# Patient Record
Sex: FEMALE | Employment: OTHER | URBAN - METROPOLITAN AREA
[De-identification: names, ages, dates, MRNs, and addresses within clinical notes are randomized per-mention and may not be internally consistent; named-entity substitution may affect disease eponyms.]

---

## 2019-04-08 ENCOUNTER — HOSPITAL ENCOUNTER (OUTPATIENT)
Dept: PHYSICAL THERAPY | Age: 64
Setting detail: THERAPIES SERIES
Discharge: HOME | End: 2019-04-08
Payer: COMMERCIAL

## 2019-04-08 PROCEDURE — 97140 MANUAL THERAPY 1/> REGIONS: CPT

## 2019-04-08 PROCEDURE — 97162 PT EVAL MOD COMPLEX 30 MIN: CPT

## 2019-04-08 ASSESSMENT — PAIN SCALES - GENERAL: PAINLEVEL_OUTOF10: 2

## 2019-04-08 NOTE — PROGRESS NOTES
Physical Therapy  Initial Assessment  Date: 2019  Patient Name: Elena Guerrero  MRN: [id number]  : 1955  Subjective   General  Chart Reviewed: Yes  Patient assessed for rehabilitation services?: Yes  Family / Caregiver Present: No  Referring Practitioner: Bull Treviño  Referral Date : 19  Diagnosis: R A' Pain  Follows Commands: Within Functional Limits  General Comment  Comments: PLOF: full functional activity without limits to pain. Walking without pain limitations. PT Visit Information  Onset Date: 19  PT Insurance Information: Medical [place]   Subjective  Subjective: Pt reports insidiously increased pain in the R medial ankle with weight bearing activities since 2019 that worsened in february with a lot of walking during vacation. Advil helped get her through the pain with exercising in February. Increased pain after walking and exercise at that time. Mid march MD recommended 500mg ibuprofen 2x/day for 2 weeks with improvement in pain. Continues to c/o pain with walking shoes around the house and even with shoes in the community. Tennis shoes provide the least amount of pain. Pt walks for exercise and pre-injury was walking 45-60 mins at 14 min/mile. Not able to do that. 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 Pt main complaint is not being able to walk. Pt has gained 10 lbs over the past year. No treatments to date. 10+ year history of 'twisting' the ankle with falls 2x. No N/T. No lumbar issues. No hip problems. \"crunchy' knees but no pain in the knees. Pain Screening  Patient Currently in Pain: Yes  Pain Assessment  Pain Assessment: 0-10  Pain Level: 2(at worst 6-7/10 )  Vital Signs  Patient Currently in Pain: Yes    Objective     Observation/Palpation  Palpation: Increased tenderness medial ankle, posterior tibialis  Observation: Standing:  L increased pes planus, hip IR and K' valgus. R great toe bunion. R pelvic rotation.  Increased WB R forefoot and L rear foot.   Body Mechanics: Squat test: increased R weight shift, quad dominance but OK, lumbar extension. SLS: R with lateral trunk shift compensation and LOB corrections lower leg. L with L hip lateral compensation and better stability. SLS squat L with good overall form, unable to do R without assist, increased K' valgus and hip IR. AMB:  increased B hip IR and K' valgus. Lumbar ERS, decreased R pes planus. PROM RLE (degrees)  RLE PROM: WNL  RLE General PROM: Hip ER 20 degrees  AROM RLE (degrees)  RLE AROM: WNL  PROM LLE (degrees)  LLE PROM: WNL  LLE General PROM: Hip IR 20 degrees   AROM LLE (degrees)  LLE AROM : WNL  Joint Mobility  Spine: Hypomobility R SI  ROM RLE: R AI, Hypomobility R tibio-femoral joint extension. Hypomobility TC joint, talar and calcaneal joint, mid foot. ROM LLE: Hypomobility L tibial IR on femur. Strength RLE  Strength RLE: WNL  Comment: Hip flexion 4-/5; Hip ER 4-/5; Hip IR 4/5  Strength LLE  Strength LLE: WNL  Comment: Hip flexion 4-/5; Hip ER 4-/5; Hip IR 4/5   Strength Other  Other: Hip extension with HS dominance and lumbar extension ERS     Additional Measures  Other: (-) slump test B (tightness in R hamstring); (-) clonus, (-) babinski. Normal DTR B LEs. Sensation to pin prick equal B LEs. Assessment   Conditions Requiring Skilled Therapeutic Intervention  Body structures, Functions, Activity limitations: Decreased functional mobility ; Decreased ROM; Decreased strength  Assessment: Pt presents with significant hypomobility throughout R LE including R hip joint, tibio-femoral joint and ankle/foot joints.     Prognosis: Good  Decision Making: Medium Complexity  REQUIRES PT FOLLOW UP: Yes         Plan   Plan  Times per week: 2x/wk for 4 weeks   Current Treatment Recommendations: Strengthening, ROM, Gait Training, Manual Therapy - Joint Manipulation, Neuromuscular Re-education, Balance Training, Endurance Training, Home Exercise Program, Manual Therapy - Soft Tissue

## 2019-04-08 NOTE — FLOWSHEET NOTE
Physical Therapy Daily Treatment Note    Date:  2019    Patient Name:  Tejinder Hummel    :  1955  MRN: [id number]  Restrictions/Precautions:    Medical/Treatment Diagnosis Information:  Diagnosis: R A' Pain  Insurance/Certification information:  PT Insurance Information: Medical [place]   Physician Information:  Referring Practitioner: Babak Warner  Plan of care signed (Y/N): Y  Visit# / total visits:      G-Code (if applicable):         PT G-Codes  Functional Assessment Tool Used: LEFS  Score: 38% disability     Time in:   11:00      Timed Treatment: 30 Total Treatment Time:  60  ________________________________________________________________________________________    Pain Level:    4/10  SUBJECTIVE:  See initial evaluation    OBJECTIVE:     Exercise/Equipment Resistance/Repetitions Other comments          Hip neuro re-ed IR/ER   NV    Clamshells  NV    TA sets NV    Bridging NV    Stacking with MB   NV     BAPS    NV                                    TG    x6 mins                          Ankle pumps x20    Ankle alphabet NV    Towel crunches NV             Other Therapeutic Activities:      Manual Treatments:  Gr 4 thoracic distraction with cavitation at set up. Gr 4 lumbar gapping with cavitation at set up L4-5. Gr 2-3 tibio-femoral M/L glides and tibial IR mobs. Gr 4 TC joint with cavitation. Gr 5 LAD with cavitation. Gr 4-5 mid foot mobs without cavitation. Talar whip. Calcaneal mobs gr 3-4. Modalities:      Test/Measurements:         ASSESSMENT:         Treatment/Activity Tolerance:   X Patient tolerated treatment well ? Patient limited by fatique  ? Patient limited by pain ? Patient limited by other medical complications  ?  Other:     Goals:          Long term goals  Time Frame for Long term goals : 4 weeks   Long term goal 1: Pt independent with HEP   Long term goal 2: Pt walk without limits to L ankle pain  Long term goal 3: Pt gluteal strength improve by 2/3 muscle

## 2019-04-10 ENCOUNTER — HOSPITAL ENCOUNTER (OUTPATIENT)
Dept: PHYSICAL THERAPY | Age: 64
Setting detail: THERAPIES SERIES
Discharge: HOME | End: 2019-04-10
Payer: COMMERCIAL

## 2019-04-10 PROCEDURE — 97140 MANUAL THERAPY 1/> REGIONS: CPT

## 2019-04-10 PROCEDURE — 97110 THERAPEUTIC EXERCISES: CPT

## 2019-04-10 NOTE — FLOWSHEET NOTE
Physical Therapy Daily Treatment Note    Date:  4/10/2019    Patient Name:  Jayla Yoon    :  1955  MRN: [id number]  Restrictions/Precautions:    Medical/Treatment Diagnosis Information:  Diagnosis: R A' Pain  Insurance/Certification information:  PT Insurance Information: Medical [place]   Physician Information:  Referring Practitioner: Beatriz Lee  Plan of care signed (Y/N): Y  Visit# / total visits:  2/8    G-Code (if applicable):         PT G-Codes  Functional Assessment Tool Used: LEFS  Score: 38% disability     Time in:   2:00      Timed Treatment: 30 Total Treatment Time:  30  ________________________________________________________________________________________    Pain Level:    0-1/10  SUBJECTIVE:  Significant improvement in condition after first manual treatment. Pain is decreased. Easier and improved R foot/ankle movement. OBJECTIVE:     Exercise/Equipment Resistance/Repetitions Other comments          Hip neuro re-ed IR/ER   x5 mins R    Clamshells  NV    TA sets       With march Until achieved with BP cuff  x10 HEP video    Bridging 10x5 secs  HEP video    Stacking with MB   NV     BAPS    NV                                    TG    x6 mins                          Ankle pumps x20 HEP    Ankle alphabet x1 HEP    Towel crunches x1 min HEP             Other Therapeutic Activities:      Manual Treatments:  Gr 4 thoracic distraction with cavitation at set up. Gr 4 lumbar gapping without cavitation L4-5. Gr 2-3 tibio-femoral M/L glides and tibial IR mobs. Gr 4 TC joint with cavitation. Gr 5 LAD with cavitation. Gr 4-5 mid foot mobs without cavitation. Talar whip. Calcaneal mobs gr 3-4. Modalities:      Test/Measurements:         ASSESSMENT:         Treatment/Activity Tolerance:   X Patient tolerated treatment well ? Patient limited by fatique  ? Patient limited by pain ? Patient limited by other medical complications  ?  Other:     Goals:          Long term goals  Time

## 2019-04-15 ENCOUNTER — APPOINTMENT (OUTPATIENT)
Dept: PHYSICAL THERAPY | Age: 64
End: 2019-04-15
Payer: COMMERCIAL

## 2019-04-18 ENCOUNTER — HOSPITAL ENCOUNTER (OUTPATIENT)
Dept: PHYSICAL THERAPY | Age: 64
Setting detail: THERAPIES SERIES
Discharge: HOME | End: 2019-04-18
Payer: COMMERCIAL

## 2019-04-18 PROCEDURE — 97110 THERAPEUTIC EXERCISES: CPT

## 2019-04-18 PROCEDURE — 97140 MANUAL THERAPY 1/> REGIONS: CPT

## 2019-04-18 NOTE — FLOWSHEET NOTE
Physical Therapy Daily Treatment Note    Date:  2019    Patient Name:  Manuel Bello    :  1955  MRN: [id number]  Restrictions/Precautions:    Medical/Treatment Diagnosis Information:  Diagnosis: R A' Pain  Insurance/Certification information:  PT Insurance Information: Medical [place]   Physician Information:  Referring Practitioner: Mireya Martinez  Plan of care signed (Y/N): Y  Visit# / total visits:  3/8    G-Code (if applicable):         PT G-Codes  Functional Assessment Tool Used: LEFS  Score: 38% disability     Time in:   9:00      Timed Treatment: 45 Total Treatment Time:  45  ________________________________________________________________________________________    Pain Level:    0-1/10  SUBJECTIVE:  Pt reports feeling better after last session but was very busy and carrying a lot of things over the past few days, running a lot of errands. 'May have overdone it'. Inside of the ankle has a 'pinching' pain. OBJECTIVE:     Exercise/Equipment Resistance/Repetitions Other comments          Hip neuro re-ed IR/ER       [place]  NV    TA sets       With march  HEP video    Bridging  HEP video    Stacking with MB   2x30 secs B      BAPS    NV     Rockerboard x1 min F/B M/L  x1 min rocking each                            TG    x6 mins                          Ankle pumps  HEP    Ankle alphabet x1 HEP    Towel crunches  HEP             Other Therapeutic Activities:      Manual Treatments:  Gr 4 thoracic distraction with cavitation at set up. Gr 4 lumbar gapping without cavitation L4-5. Gr 2-3 tibio-femoral M/L glides and tibial IR mobs. Gr 4 TC joint with cavitation. .  Gr 4-5 mid foot mobs without cavitation. Talar whip. Calcaneal mobs gr 3-4. STM posterior tibialis followed by ART and manual stretching. Modalities:      Test/Measurements:         ASSESSMENT:     Pt responded well to manual therapy to release posterior tib tendon with 0/10 pain and full pain-free ankle ROM. Initiated ankle stabilization exercises in WB today. Treatment/Activity Tolerance:   X Patient tolerated treatment well ? Patient limited by fatique  ? Patient limited by pain ? Patient limited by other medical complications  ? Other:     Goals:          Long term goals  Time Frame for Long term goals : 4 weeks   Long term goal 1: Pt independent with HEP   Long term goal 2: Pt walk without limits to L ankle pain  Long term goal 3: Pt gluteal strength improve by 2/3 muscle grade grossly  Long term goal 4: Pt L ankle and foot joint mobility to WNL without pain. Long term goal 5: Pt return to PLOF      Plan: ? Continue per plan of care ? Alter current plan (see comments)   ? Plan of care initiated ? Hold pending MD visit ?  Discharge      Plan for Next Session:      Re-Certification Due Date:         Signature:  Robyn Zapata

## 2019-04-22 ENCOUNTER — HOSPITAL ENCOUNTER (OUTPATIENT)
Dept: PHYSICAL THERAPY | Age: 64
Setting detail: THERAPIES SERIES
Discharge: HOME | End: 2019-04-22
Payer: COMMERCIAL

## 2019-04-22 PROCEDURE — 97110 THERAPEUTIC EXERCISES: CPT

## 2019-04-22 PROCEDURE — 97140 MANUAL THERAPY 1/> REGIONS: CPT

## 2019-04-22 NOTE — FLOWSHEET NOTE
Physical Therapy Daily Treatment Note    Date:  2019    Patient Name:  Daphney Walker    :  1955  MRN: [id number]  Restrictions/Precautions:    Medical/Treatment Diagnosis Information:  Diagnosis: R A' Pain  Insurance/Certification information:  PT Insurance Information: Medical [place]   Physician Information:  Referring Practitioner: Josephine Figueroa  Plan of care signed (Y/N): Y  Visit# / total visits:      G-Code (if applicable):         PT G-Codes  Functional Assessment Tool Used: LEFS  Score: 38% disability     Time in:   9:00      Timed Treatment: 45 Total Treatment Time:  45  ________________________________________________________________________________________    Pain Level:    0-1/10  SUBJECTIVE:  Pt reports feeling better after last session but has been busy since that time. Continues to have pain in the ankle with certain movements. OBJECTIVE:     Exercise/Equipment Resistance/Repetitions Other comments          Hip neuro re-ed IR/ER       [place]  NV    TA sets       With march  HEP video    Bridging  HEP video    Stacking with MB   2x30 secs B      BAPS    x20 all directions seated     Rockerboard x1 min F/B M/L  x1 min rocking each    Foam pad with EC   x1 min                      TG    x6 mins                   A' inversion/eversion   x15 B     Ankle pumps  HEP    Ankle alphabet x2 HEP    Towel crunches  HEP             Other Therapeutic Activities:      Manual Treatments:   Gr 4 TC joint with cavitation. .  Gr 4-5 mid foot mobs without cavitation. Talar whip. Calcaneal mobs gr 3-4. STM posterior tibialis followed by ART and manual stretching. Modalities:      Test/Measurements:         ASSESSMENT:     Pt responded well to manual therapy to release posterior tib tendon with 0/10 pain and full pain-free ankle ROM. Progressed ankle stabilization exercises in WB today. Treatment/Activity Tolerance:   X Patient tolerated treatment well ?  Patient limited by fatique  ? Patient limited by pain ? Patient limited by other medical complications  ? Other:     Goals:          Long term goals  Time Frame for Long term goals : 4 weeks   Long term goal 1: Pt independent with HEP   Long term goal 2: Pt walk without limits to L ankle pain  Long term goal 3: Pt gluteal strength improve by 2/3 muscle grade grossly  Long term goal 4: Pt L ankle and foot joint mobility to WNL without pain. Long term goal 5: Pt return to PLOF      Plan: ? Continue per plan of care ? Alter current plan (see comments)   ? Plan of care initiated ? Hold pending MD visit ?  Discharge      Plan for Next Session:      Re-Certification Due Date:         Signature:  Darryl Anderson

## 2019-04-25 ENCOUNTER — HOSPITAL ENCOUNTER (OUTPATIENT)
Dept: PHYSICAL THERAPY | Age: 64
Setting detail: THERAPIES SERIES
Discharge: HOME | End: 2019-04-25
Payer: COMMERCIAL

## 2019-04-25 PROCEDURE — 97110 THERAPEUTIC EXERCISES: CPT

## 2019-04-25 PROCEDURE — 97140 MANUAL THERAPY 1/> REGIONS: CPT

## 2019-04-25 NOTE — FLOWSHEET NOTE
Physical Therapy Daily Treatment Note    Date:  2019    Patient Name:  Ankita Osborne    :  1955  MRN: [id number]  Restrictions/Precautions:    Medical/Treatment Diagnosis Information:  Diagnosis: R A' Pain  Insurance/Certification information:  PT Insurance Information: Medical [place]   Physician Information:  Referring Practitioner: Mathieu Stauffer  Plan of care signed (Y/N): Y  Visit# / total visits:      G-Code (if applicable):         PT G-Codes  Functional Assessment Tool Used: LEFS  Score: 38% disability     Time in:  11:30      Timed Treatment: 45 Total Treatment Time:  45  ________________________________________________________________________________________    Pain Level:    0-1/10  SUBJECTIVE:  Pt notes ankle is not as painful. Ankle feels good enough that she 'feels back to normal' and not babying it. OBJECTIVE:     Exercise/Equipment Resistance/Repetitions Other comments          Hip neuro re-ed IR/ER       Victoriano DORANTES    TA sets       With march  HEP video    Bridging  HEP video    Stacking with MB   2x30 secs B      BAPS    x20 all directions seated     Rockerboard x1 min F/B M/L  x1 min rocking each    Foam pad with EC   x1 min                      TG    x6 mins                   A' inversion/eversion   x15 B     Ankle pumps  HEP    Ankle alphabet x1 HEP    Towel crunches x2 mins total HEP             Other Therapeutic Activities:      Manual Treatments:   Gr 4 TC joint with cavitation. .  Gr 4-5 mid foot mobs without cavitation. Talar whip. Calcaneal mobs gr 3-4. Ankle LAD. STM posterior tibialis followed by ART and manual stretching. Modalities:      Test/Measurements:         ASSESSMENT:     Pt responded well to manual therapy to release posterior tib tendon with 0/10 pain and full pain-free ankle ROM. Progressed ankle stabilization exercises in WB today. Treatment/Activity Tolerance:   X Patient tolerated treatment well ?  Patient limited by fatique  ? Patient limited by pain ? Patient limited by other medical complications  ? Other:     Goals:          Long term goals  Time Frame for Long term goals : 4 weeks   Long term goal 1: Pt independent with HEP   Long term goal 2: Pt walk without limits to L ankle pain  Long term goal 3: Pt gluteal strength improve by 2/3 muscle grade grossly  Long term goal 4: Pt L ankle and foot joint mobility to WNL without pain. Long term goal 5: Pt return to PLOF      Plan: ? Continue per plan of care ? Alter current plan (see comments)   ? Plan of care initiated ? Hold pending MD visit ?  Discharge      Plan for Next Session:      Re-Certification Due Date:         Signature:  Jyothi Kwok

## 2019-04-29 ENCOUNTER — HOSPITAL ENCOUNTER (OUTPATIENT)
Dept: PHYSICAL THERAPY | Age: 64
Setting detail: THERAPIES SERIES
Discharge: HOME | End: 2019-04-29
Payer: COMMERCIAL

## 2019-04-29 PROCEDURE — 97140 MANUAL THERAPY 1/> REGIONS: CPT

## 2019-04-29 PROCEDURE — 97110 THERAPEUTIC EXERCISES: CPT

## 2019-04-29 NOTE — FLOWSHEET NOTE
Physical Therapy Daily Treatment Note    Date:  2019    Patient Name:  Leon Hartman    :  1955  MRN: [id number]  Restrictions/Precautions:    Medical/Treatment Diagnosis Information:  Diagnosis: R A' Pain  Insurance/Certification information:  PT Insurance Information: Medical [place]   Physician Information:  Referring Practitioner: Lucía French  Plan of care signed (Y/N): Y  Visit# / total visits:      G-Code (if applicable):         PT G-Codes  Functional Assessment Tool Used: LEFS  Score: 38% disability     Time in:  9:00     Timed Treatment: 30 Total Treatment Time:  30  ________________________________________________________________________________________    Pain Level:    0-1/10  SUBJECTIVE:  Pt notes doing much better. Not '100%' but much better. OBJECTIVE:     Exercise/Equipment Resistance/Repetitions Other comments          Hip neuro re-ed IR/ER       Clamshells  NV    TA sets       With march  HEP video    Bridging  HEP video    Stacking with MB   2x30 secs B      BAPS    x20 all directions    Rockerboard x1 min F/B M/L  x1 min rocking each    Foam pad with EC         Tandem stance x1 min feet together  x30 secs B     gastroc stretch on slant board     3x30 secs R    Mini lunge    x15 B    TG    x6 mins                   A' inversion/eversion   x15 B     Ankle pumps  HEP    Ankle alphabet x1 HEP    Towel crunches x2 mins total HEP             Other Therapeutic Activities:      Manual Treatments:   Gr 4 TC joint with cavitation. .  Gr 4-5 mid foot mobs without cavitation. Talar whip. Calcaneal mobs gr 3-4. Ankle LAD. STM posterior tibialis followed by ART and manual stretching. Modalities:      Test/Measurements:         ASSESSMENT:     Pt responded well to manual therapy to release posterior tib tendon with 0/10 pain and full pain-free ankle ROM. Progressed ankle stabilization exercises in WB today.       Treatment/Activity Tolerance:   X Patient tolerated

## 2019-05-01 ENCOUNTER — HOSPITAL ENCOUNTER (OUTPATIENT)
Dept: PHYSICAL THERAPY | Age: 64
Setting detail: THERAPIES SERIES
Discharge: HOME | End: 2019-05-01
Payer: COMMERCIAL

## 2019-05-01 PROCEDURE — 97140 MANUAL THERAPY 1/> REGIONS: CPT

## 2019-05-01 PROCEDURE — 97110 THERAPEUTIC EXERCISES: CPT

## 2019-05-01 NOTE — FLOWSHEET NOTE
Physical Therapy Daily Treatment Note    Date:  2019    Patient Name:  Esau Gibbs    :  1955  MRN: [id number]  Restrictions/Precautions:    Medical/Treatment Diagnosis Information:  Diagnosis: R A' Pain  Insurance/Certification information:  PT Insurance Information: Medical [place]   Physician Information:  Referring Practitioner: Stephen Sandy  Plan of care signed (Y/N): Y  Visit# / total visits:      G-Code (if applicable):         PT G-Codes  Functional Assessment Tool Used: LEFS  Score: 38% disability     Time in:  9:00     Timed Treatment: 30 Total Treatment Time:  30  ________________________________________________________________________________________    Pain Level:    0-4/10  SUBJECTIVE:  Pt notes increased pain after doing 'a lot of shopping'. Increased aggravation with picking up bags from the floor and doing a lot of carrying bags and on feet all day. OBJECTIVE:     Exercise/Equipment Resistance/Repetitions Other comments          Hip neuro re-ed IR/ER       Victoriano DORANTES    TA sets       With march  HEP video    Bridging  HEP video    Stacking with MB   2x30 secs B      BAPS    x20 all directions    Rockerboard x1 min F/B M/L  x1 min rocking each    Foam pad with EC         Tandem stance x1 min feet together  x1 min B     gastroc stretch on slant board     3x30 secs R    Mini lunge        TG    x6 mins     S' extension   x15 maroon TB     Lateral sling   x15 maroon TB B     A' inversion/eversion   x15 B     Ankle pumps  HEP    Ankle alphabet x1 HEP    Towel crunches x2 mins total HEP             Other Therapeutic Activities:      Manual Treatments:   Gr 4 TC joint with cavitation. .  Gr 4-5 mid foot mobs without cavitation. Talar whip. Calcaneal mobs gr 3-4. Ankle LAD. STM posterior tibialis followed by ART and manual stretching.           Modalities:      Test/Measurements:         ASSESSMENT:     Pt responded well to manual therapy to release posterior tib tendon with 0/10 pain and full pain-free ankle ROM. Progressed ankle stabilization exercises in WB today. Treatment/Activity Tolerance:   X Patient tolerated treatment well ? Patient limited by fatique  ? Patient limited by pain ? Patient limited by other medical complications  ? Other:     Goals:          Long term goals  Time Frame for Long term goals : 4 weeks   Long term goal 1: Pt independent with HEP   Long term goal 2: Pt walk without limits to L ankle pain  Long term goal 3: Pt gluteal strength improve by 2/3 muscle grade grossly  Long term goal 4: Pt L ankle and foot joint mobility to WNL without pain. Long term goal 5: Pt return to PLOF      Plan: ? Continue per plan of care ? Alter current plan (see comments)   ? Plan of care initiated ? Hold pending MD visit ?  Discharge      Plan for Next Session:      Re-Certification Due Date:         Signature:  Robyn Zapata

## 2019-05-06 ENCOUNTER — HOSPITAL ENCOUNTER (OUTPATIENT)
Dept: PHYSICAL THERAPY | Age: 64
Setting detail: THERAPIES SERIES
Discharge: HOME | End: 2019-05-06
Payer: COMMERCIAL

## 2019-05-06 PROCEDURE — 97140 MANUAL THERAPY 1/> REGIONS: CPT

## 2019-05-06 NOTE — PROGRESS NOTES
Outpatient Physical Therapy  Phone: [phone] Fax: [phone]     To: Stephen Sandy      From: Vanessa Alvarez PT     Patient: Esau Gibbs       : 1955  Diagnosis: R A' Pain      Date: 2019  Treatment Diagnosis:      Physical Therapy Progress/Discharge Note    Total Visits to date:     Cancels/No-shows to date:      Plan of Care/Treatment to date:  [x] Therapeutic Exercise    [] Modalities:  [x] Therapeutic Activity     [] Ultrasound   [] Electrical Stimulation   [x] Gait Training      [] Cervical Traction   [] Lumbar Traction  [x] Neuromuscular Re-education  [] Cold/hotpack  [] Iontophoresis  [x] Instruction in HEP      Other:  [x] Manual Therapy       []                                 [] Aquatic Therapy       []                               ? Progress towards goals:  See progress note    Frequency/Duration:  # Days per week: [] 1 day # Weeks: [] 1 week [x] 4 weeks      [x] 2 days? [] 2 weeks [] 5 weeks     [] 3 days   [] 3 weeks [] 6 weeks     Rehab Potential: [] Excellent [x] Good [] Fair  [] Poor     Goal Status:  [] Achieved [x] Partially Achieved  [] Not Achieved     Patient Status: [] Continue per initial plan of Care     [] Patient now discharged     [x] Additional visits requested, Please re-certify for additional visits:      Requested frequency/duration:  2X/week for 4 weeks    Electronically signed by:  Vanessa Alvarez PT    If you have any questions or concerns, please don't hesitate to call.   Thank you for your referral.    Physician Signature:________________________________Date:__________________  By signing above, therapists plan is approved by physician

## 2019-05-06 NOTE — FLOWSHEET NOTE
Physical Therapy Daily Treatment Note    Date:  2019    Patient Name:  Manuel Bello    :  1955  MRN: [id number]  Restrictions/Precautions:    Medical/Treatment Diagnosis Information:  Diagnosis: R A' Pain  Insurance/Certification information:  PT Insurance Information: Medical [place]   Physician Information:  Referring Practitioner: Mireya Martinez  Plan of care signed (Y/N): Y  Visit# / total visits:      G-Code (if applicable):         PT G-Codes  Functional Assessment Tool Used: LEFS  Score: 19   27% disability    At initial evaluation 38% disability     Time in:  9:00     Timed Treatment: 30 Total Treatment Time:  30  ________________________________________________________________________________________    Pain Level:    0-4/10  SUBJECTIVE:  Pt reports on her feet all day after PT with resulting pinching feel in the arch and medial foot B.             OBJECTIVE:   Full AROM R ankle and foot       ASSESSMENT:     Pt responded well to manual therapy to release posterior tib tendon with 0/10 pain and full pain-free ankle ROM. Progressed ankle stabilization exercises in WB the past few visits. Recommended pt trial standard shoe inserts to support B arches and decrease strain on posterior tibialis. Overall, significant improvement in condition but continues to aggravate with prolonged standing. Recommend continued PT 2x/wk for 3-4 weeks to progress POC. Treatment/Activity Tolerance:   X Patient tolerated treatment well ? Patient limited by fatique  ? Patient limited by pain ? Patient limited by other medical complications  ? Other:     Goals:        Long term goals  Time Frame for Long term goals : 4 weeks   Long term goal 1: Pt independent with HEP  (met)  Long term goal 2: Pt walk without limits to L ankle pain  (improved)  Long term goal 3: Pt gluteal strength improve by 2/3 muscle grade grossly  Long term goal 4: Pt L ankle and foot joint mobility to WNL without pain. (improved)  Long term goal 5: Pt return to PLOF (not met)      Plan: ? Continue per plan of care ? Alter current plan (see comments)   ? Plan of care initiated ? Hold pending MD visit ?  Discharge      Plan for Next Session:      Re-Certification Due Date:         Signature:  Lawson Henry

## 2019-05-06 NOTE — FLOWSHEET NOTE
Physical Therapy Daily Treatment Note    Date:  2019    Patient Name:  Elena Guerrero    :  1955  MRN: [id number]  Restrictions/Precautions:    Medical/Treatment Diagnosis Information:  Diagnosis: R A' Pain  Insurance/Certification information:  PT Insurance Information: Medical [place]   Physician Information:  Referring Practitioner: Bull Treviño  Plan of care signed (Y/N): Y  Visit# / total visits:      G-Code (if applicable):         PT G-Codes  Functional Assessment Tool Used: LEFS  Score: 19   27% disability    At initial evaluation 38% disability     Time in:  9:00     Timed Treatment: 30 Total Treatment Time:  30  ________________________________________________________________________________________    Pain Level:    0-4/10  SUBJECTIVE:  Pt reports on her feet all day after PT with resulting pinching feel in the arch and medial foot B.             OBJECTIVE:     Exercise/Equipment Resistance/Repetitions Other comments          Hip neuro re-ed IR/ER       Victoriano  [place]    TA sets       With march  HEP video    Bridging  HEP video    Stacking with MB   2x30 secs B      BAPS    x20 all directions      Rockerboard x1 min F/B M/L  x1 min rocking each    Foam pad with EC         Tandem stance x1 min feet together  x1 min B     gastroc stretch on slant board     3x30 secs R    Mini lunge        TG    x6 mins     S' extension   x15 maroon TB     Lateral sling   x15 maroon TB B     A' inversion/eversion   x15 B     Ankle pumps  HEP    Ankle alphabet x1 HEP    Towel crunches x2 mins total HEP             Other Therapeutic Activities:      Manual Treatments:  Gr 4 lumbar gapping without cavitation L4-5. Gr 4 TC joint with cavitation. .  Gr 4-5 mid foot mobs without cavitation. Talar whip. Calcaneal mobs gr 3-4. Ankle LAD. STM posterior tibialis followed by ART and manual stretching.    Sciatic tracing and flossing        Modalities:      Test/Measurements:         ASSESSMENT:     Pt responded well to manual therapy to release posterior tib tendon with 0/10 pain and full pain-free ankle ROM. Progressed ankle stabilization exercises in WB the past few visits. Recommended pt trial standard shoe inserts to support B arches and decrease strain on posterior tibialis. Overall, significant improvement in condition but continues to aggravate with prolonged standing. Recommend continued PT 2x/wk for 3-4 weeks to progress POC. Treatment/Activity Tolerance:   X Patient tolerated treatment well ? Patient limited by fatique  ? Patient limited by pain ? Patient limited by other medical complications  ? Other:     Goals:          Long term goals  Time Frame for Long term goals : 4 weeks   Long term goal 1: Pt independent with HEP  (met)  Long term goal 2: Pt walk without limits to L ankle pain  (improved)  Long term goal 3: Pt gluteal strength improve by 2/3 muscle grade grossly  Long term goal 4: Pt L ankle and foot joint mobility to WNL without pain. (improved)  Long term goal 5: Pt return to PLOF (not met)      Plan: ? Continue per plan of care ? Alter current plan (see comments)   ? Plan of care initiated ? Hold pending MD visit ?  Discharge      Plan for Next Session:      Re-Certification Due Date:         Signature:  Lesvia Solis

## 2019-05-08 ENCOUNTER — HOSPITAL ENCOUNTER (OUTPATIENT)
Dept: PHYSICAL THERAPY | Age: 64
Setting detail: THERAPIES SERIES
Discharge: HOME | End: 2019-05-08
Payer: COMMERCIAL

## 2019-05-08 PROCEDURE — 97110 THERAPEUTIC EXERCISES: CPT

## 2019-05-08 PROCEDURE — 97140 MANUAL THERAPY 1/> REGIONS: CPT

## 2019-05-08 NOTE — FLOWSHEET NOTE
Physical Therapy Daily Treatment Note    Date:  2019    Patient Name:  Shwetha Perez    :  1955  MRN: [id number]  Restrictions/Precautions:    Medical/Treatment Diagnosis Information:  Diagnosis: R A' Pain  Insurance/Certification information:  PT Insurance Information: Medical [place]   Physician Information:  Referring Practitioner: No Kimble  Plan of care signed (Y/N): Y  Visit# / total visits:      G-Code (if applicable):         PT G-Codes  Functional Assessment Tool Used: LEFS  Score: 19   27% disability    At initial evaluation 38% disability     Time in:  9:00     Timed Treatment: 30 Total Treatment Time:  30  ________________________________________________________________________________________    Pain Level:    0-4/10  SUBJECTIVE:  Pt reports buying new buenrostro tennis shoes since last session and notes feeling much better. She also bought flip flops with arch support for around the house. OBJECTIVE:     Exercise/Equipment Resistance/Repetitions Other comments          Hip neuro re-ed IR/ER       Victoriano DORANTES    TA sets       With march  HEP video    Bridging  HEP video    Stacking with MB   2x30 secs B      BAPS    x20 all directions      Rockerboard x1 min F/B M/L  x1 min rocking each    Foam pad with EC         Tandem stance x1 min feet together  x1 min B     gastroc stretch on slant board     3x30 secs R    Mini lunge        TG    x6 mins     S' extension   x15 maroon TB     Lateral sling   x15 maroon TB B     A' inversion/eversion        Ankle pumps x20 after manual therapy HEP    Ankle alphabet  HEP    Towel crunches  HEP             Other Therapeutic Activities:      Manual Treatments: [place] Gr 4 TC joint with cavitation. .  Gr 4-5 mid foot mobs without cavitation. Calcaneal mobs gr 3-4. Ankle LAD. STM posterior tibialis followed by ART and manual stretching.    Sciatic tracing and flossing        Modalities:      Test/Measurements:

## 2019-12-11 ENCOUNTER — HOSPITAL ENCOUNTER (OUTPATIENT)
Dept: WOMENS IMAGING | Age: 64
Discharge: HOME | End: 2019-12-11
Payer: COMMERCIAL

## 2019-12-11 DIAGNOSIS — Z12.31 ENCOUNTER FOR SCREENING MAMMOGRAM FOR BREAST CANCER: ICD-10-CM

## 2019-12-11 PROCEDURE — 77063 BREAST TOMOSYNTHESIS BI: CPT

## 2021-03-01 ENCOUNTER — TELEPHONE (OUTPATIENT)
Dept: WOMENS IMAGING | Age: 66
End: 2021-03-01

## 2021-03-01 NOTE — TELEPHONE ENCOUNTER
Left a message for the patient- trying to schedule an appointment for OVERDUE mammogram. Phone number was provided for scheduling.

## 2021-03-03 ENCOUNTER — HOSPITAL ENCOUNTER (OUTPATIENT)
Dept: WOMENS IMAGING | Age: 66
Discharge: HOME | End: 2021-03-03
Payer: COMMERCIAL

## 2021-03-03 DIAGNOSIS — Z12.31 ENCOUNTER FOR SCREENING MAMMOGRAM FOR MALIGNANT NEOPLASM OF BREAST: ICD-10-CM

## 2021-03-03 PROCEDURE — 77063 BREAST TOMOSYNTHESIS BI: CPT

## 2022-10-27 ENCOUNTER — HOSPITAL ENCOUNTER (OUTPATIENT)
Dept: WOMENS IMAGING | Age: 67
Discharge: HOME | End: 2022-10-27
Payer: COMMERCIAL

## 2022-10-27 ENCOUNTER — APPOINTMENT (OUTPATIENT)
Dept: WOMENS IMAGING | Age: 67
End: 2022-10-27
Payer: COMMERCIAL

## 2022-10-27 DIAGNOSIS — Z12.31 VISIT FOR SCREENING MAMMOGRAM: ICD-10-CM

## 2022-10-27 PROCEDURE — 77063 BREAST TOMOSYNTHESIS BI: CPT

## 2022-11-10 ENCOUNTER — HOSPITAL ENCOUNTER (OUTPATIENT)
Dept: WOMENS IMAGING | Age: 67
Discharge: HOME | End: 2022-11-10
Payer: COMMERCIAL

## 2022-11-10 DIAGNOSIS — M81.0 SENILE OSTEOPOROSIS: ICD-10-CM

## 2022-11-10 PROCEDURE — 77080 DXA BONE DENSITY AXIAL: CPT
